# Patient Record
(demographics unavailable — no encounter records)

---

## 2024-10-10 NOTE — HISTORY OF PRESENT ILLNESS
[de-identified] : Fall [FreeTextEntry6] : Child slipped from a wooden slide this morning, 3 hours ago bruising on left side of cheek- mom wants to make sure child is okay. no loc no vomiting no changes in behavior

## 2024-10-10 NOTE — PHYSICAL EXAM
[NL] : moves all extremities x4, warm, well perfused x4 [de-identified] : bruise on the left cheek

## 2024-10-10 NOTE — PHYSICAL EXAM
[NL] : moves all extremities x4, warm, well perfused x4 [de-identified] : bruise on the left cheek

## 2024-10-23 NOTE — HISTORY OF PRESENT ILLNESS
[Mother] : mother [Cow's milk (Ounces per day ___)] : consumes [unfilled] oz of Cow's milk per day [Fruit] : fruit [Vegetables] : vegetables [Meat] : meat [Eggs] : eggs [Table food] : table food [Normal] : Normal [In crib] : In crib [Pacifier use] : Pacifier use [Brushing teeth] : Brushing teeth [Playtime] : Playtime  [No] : No cigarette smoke exposure [Water heater temperature set at <120 degrees F] : Water heater temperature set at <120 degrees F [Car seat in back seat] : Car seat in back seat [Carbon Monoxide Detectors] : Carbon monoxide detectors [Smoke Detectors] : Smoke detectors [Exposure to electronic nicotine delivery system] : No exposure to electronic nicotine delivery system [Up to date] : Up to date [de-identified] : straw cup

## 2024-10-23 NOTE — PHYSICAL EXAM

## 2024-11-20 NOTE — HISTORY OF PRESENT ILLNESS
[de-identified] : Vaccine [FreeTextEntry6] : Mother brought pt ion due to a follow up vaccine No fever during time of visit.

## 2024-11-20 NOTE — HISTORY OF PRESENT ILLNESS
[de-identified] : Vaccine [FreeTextEntry6] : Mother brought pt ion due to a follow up vaccine No fever during time of visit.

## 2024-11-29 NOTE — HISTORY OF PRESENT ILLNESS
[EENT/Resp Symptoms] : EENT/RESPIRATORY SYMPTOMS [Runny Nose] : runny nose [Fever] : no fever [Nasal Congestion] : no nasal congestion [Cough] : no cough [Vomiting] : no vomiting [Diarrhea] : no diarrhea [Decreased Urine Output] : no decreased urine output [de-identified] : no meds, no allergies

## 2024-12-04 NOTE — DISCUSSION/SUMMARY
[FreeTextEntry1] : Symptoms likely due to viral URI. Recommend supportive care including antipyretics, fluids, and nasal saline followed by nasal suction. Return if symptoms worsen or persist. if Sob or tachypnea or distress to go to ER apply bactroban on wound

## 2024-12-04 NOTE — HISTORY OF PRESENT ILLNESS
[de-identified] : COUGH [FreeTextEntry6] : pt is still having a phlegmy cough Pt is + for RSV and Entero

## 2024-12-04 NOTE — HISTORY OF PRESENT ILLNESS
[de-identified] : COUGH [FreeTextEntry6] : pt is still having a phlegmy cough Pt is + for RSV and Entero

## 2024-12-09 NOTE — HISTORY OF PRESENT ILLNESS
[de-identified] : Cough & fever [FreeTextEntry6] : Mother states pt have cough and did a fever of 101. Mother give Mortin at 7 in the [place] no fever during visit worsening of cough decrease oral intake no diarrhea

## 2024-12-09 NOTE — HISTORY OF PRESENT ILLNESS
[de-identified] : Cough & fever [FreeTextEntry6] : Mother states pt have cough and did a fever of 101. Mother give Mortin at 7 in the [place] no fever during visit worsening of cough decrease oral intake no diarrhea

## 2024-12-09 NOTE — PHYSICAL EXAM
[Clear Rhinorrhea] : clear rhinorrhea [Mucoid Discharge] : mucoid discharge [Wheezing] : wheezing [Crackles] : crackles [Tachypnea] : no tachypnea [Rhonchi] : no rhonchi [NL] : warm, clear

## 2025-01-09 NOTE — HISTORY OF PRESENT ILLNESS
[de-identified] : Vaccine [FreeTextEntry6] : mother brought pt in for a follow up vaccine low-grade fever of 99 during time of visit. slight cold  doing well otherwise eating well and very active  No

## 2025-01-09 NOTE — DISCUSSION/SUMMARY
[FreeTextEntry1] : Symptoms likely due to viral URI. Recommend supportive care including antipyretics, fluids, and nasal saline followed by nasal suction. Return if symptoms worsen or persist. saline in nebulizer

## 2025-01-09 NOTE — HISTORY OF PRESENT ILLNESS
[de-identified] : Vaccine [FreeTextEntry6] : mother brought pt in for a follow up vaccine low-grade fever of 99 during time of visit. slight cold  doing well otherwise eating well and very active

## 2025-01-16 NOTE — HISTORY OF PRESENT ILLNESS
[de-identified] : Cough and runny nose [FreeTextEntry6] : Kaylin presented to the clinic with her mother for evaluation of persistent cold symptoms. The patient received a vaccine last Wednesday and subsequently developed a runny nose and a low-grade fever of 99F. The symptoms have persisted for over a week. The patient's main complaints are a cough that worsens at night, affecting her sleep, and ongoing nasal congestion and rhinorrhea. The cough is occasionally present during the day but is most problematic when the patient is lying down. There have been no further fevers since the initial low-grade temperature.

## 2025-01-16 NOTE — DISCUSSION/SUMMARY
[FreeTextEntry1] : Assessment and Plan:   - **Upper Respiratory Infection (Common Cold):** The patient is diagnosed with a common cold based on the presentation of persistent cough, nasal congestion, and rhinorrhea without fever. The symptoms have lasted for over a week, which is consistent with the typical duration of a viral upper respiratory infection.     - Continue supportive care at home      - Use saline nasal drops to help with nasal congestion      - Administer Benadryl at night for the next 3-4 days to help with sleep and cough suppression      - Consider using Albuterol (which is available at home) for cough relief      - Monitor symptoms and return if condition worsens or new symptoms develop      - No antibiotics prescribed as the condition appears to be viral in nature

## 2025-02-01 NOTE — HISTORY OF PRESENT ILLNESS
[EENT/Resp Symptoms] : EENT/RESPIRATORY SYMPTOMS [Nasal Congestion] : nasal congestion [Cough] : cough [Max Temp: ____] : Max temperature: [unfilled] [Decreased Appetite] : no decreased appetite [Decreased Urine Output] : no decreased urine output [de-identified] : brother with rhino diagnosed earlier this week cough may be improving [Vomiting] : no vomiting [Diarrhea] : no diarrhea [Rash] : no rash

## 2025-02-01 NOTE — DISCUSSION/SUMMARY
[FreeTextEntry1] : URI, likely rhino/entero: Based on the symptoms, and recent contact with a sibling diagnosed with an enterovirus, the patient is likely suffering from a viral respiratory tract infection.   - Plan: Therapeutic Interventions: Continue nebulizer treatment with saline to alleviate cough and congestion Diagnostic Tests: No further tests required at the moment, discussed swabbing but given brother has known diagnosis very likely patient has rhinovirus which would not prompt a change in recommendations.  Referrals: No referrals needed at this time Patient Education: Recommended to keep an eye on the patient and bring them back if symptoms worsen or persist.

## 2025-02-01 NOTE — HISTORY OF PRESENT ILLNESS
[EENT/Resp Symptoms] : EENT/RESPIRATORY SYMPTOMS [Nasal Congestion] : nasal congestion [Cough] : cough [Max Temp: ____] : Max temperature: [unfilled] [Decreased Appetite] : no decreased appetite [Decreased Urine Output] : no decreased urine output [de-identified] : brother with rhino diagnosed earlier this week cough may be improving [Vomiting] : no vomiting [Diarrhea] : no diarrhea [Rash] : no rash

## 2025-02-12 NOTE — HISTORY OF PRESENT ILLNESS
[FreeTextEntry6] : Pt. here for vaccine. also follow up from cough congestion and runny nose and congestion  otherwise doing well no distress  no cough

## 2025-02-12 NOTE — DISCUSSION/SUMMARY
[FreeTextEntry1] : Start antibiotic treatments if worsens or SOB or chest pain to call back Symptoms likely due to viral URI. Recommend supportive care including antipyretics, fluids, and nasal saline followed by nasal suction. Return if symptoms worsen or persist. [] : The components of the vaccine(s) to be administered today are listed in the plan of care. The disease(s) for which the vaccine(s) are intended to prevent and the risks have been discussed with the caretaker.  The risks are also included in the appropriate vaccination information statements which have been provided to the patient's caregiver.  The caregiver has given consent to vaccinate.

## 2025-03-20 NOTE — HISTORY OF PRESENT ILLNESS
[de-identified] : Follow-up vaccine  [FreeTextEntry6] : Pt is here for vaccines follow up for URI  no diarrhea no distress

## 2025-03-20 NOTE — HISTORY OF PRESENT ILLNESS
[de-identified] : Follow-up vaccine  [FreeTextEntry6] : Pt is here for vaccines follow up for URI  no diarrhea no distress

## 2025-04-23 NOTE — PHYSICAL EXAM
[Alert] : alert [No Acute Distress] : no acute distress [Normocephalic] : normocephalic [Anterior [place] Closed] : anterior fontanelle closed [Red Reflex Bilateral] : red reflex bilateral [PERRL] : PERRL [Normally Placed Ears] : normally placed ears [Auricles Well Formed] : auricles well formed [Clear Tympanic membranes with present light reflex and bony landmarks] : clear tympanic membranes with present light reflex and bony landmarks [No Discharge] : no discharge [Nares Patent] : nares patent [Palate Intact] : palate intact [Uvula Midline] : uvula midline [Tooth Eruption] : tooth eruption  [Supple, full passive range of motion] : supple, full passive range of motion [No Palpable Masses] : no palpable masses [Symmetric Chest Rise] : symmetric chest rise [Clear to Auscultation Bilaterally] : clear to auscultation bilaterally [Regular Rate and Rhythm] : regular rate and rhythm [S1, S2 present] : S1, S2 present [No Murmurs] : no murmurs [+2 Femoral Pulses] : +2 femoral pulses [Soft] : soft [NonTender] : non tender [Normoactive Bowel Sounds] : normoactive bowel sounds [Non Distended] : non distended [No Hepatomegaly] : no hepatomegaly [No Splenomegaly] : no splenomegaly [Jacques 1] : Jacques 1 [No Clitoromegaly] : no clitoromegaly [Normal Vaginal Introitus] : normal vaginal introitus [Patent] : patent [Normally Placed] : normally placed [No Abnormal Lymph Nodes Palpated] : no abnormal lymph nodes palpated [No Clavicular Crepitus] : no clavicular crepitus [Symmetric Buttocks Creases] : symmetric buttocks creases [No Spinal Dimple] : no spinal dimple [NoTuft of Hair] : no tuft of hair [Cranial Nerves Grossly Intact] : cranial nerves grossly intact [No Rash or Lesions] : no rash or lesions

## 2025-04-23 NOTE — PHYSICAL EXAM

## 2025-04-23 NOTE — COUNSELING
[Use of Plain Language] : use of plain language [Adequate] : adequate [None] : none [] : I have reviewed management goals with caretaker and provided a copy of care plan Home

## 2025-04-23 NOTE — HISTORY OF PRESENT ILLNESS
[Cow's milk (Ounces per day ___)] : consumes [unfilled] oz of Cow's milk per day [Fruit] : fruit [Vegetables] : vegetables [Meat] : meat [Eggs] : eggs [Finger Foods] : finger foods [Table food] : table food [___ voids per day] : [unfilled] voids per day [Normal] : Normal [In crib] : In crib [Sippy cup use] : Sippy cup use [Brushing teeth] : Brushing teeth [Yes] : Patient goes to dentist yearly [Toilet Training] : Toilet training [No] : Not at  exposure [Water heater temperature set at <120 degrees F] : Water heater temperature set at <120 degrees F [Car seat in back seat] : Car seat in back seat [Smoke Detectors] : Smoke detectors [Carbon Monoxide Detectors] : Carbon monoxide detectors [At risk for exposure to TB] : At risk for exposure to Tuberculosis [Playtime 60 min a day] : Playtime 60 min a day [<2 hrs of screen time] : Less than 2 hrs of screen time [Exposure to electronic nicotine delivery system] : No exposure to electronic nicotine delivery system [Up to date] : Up to date [LastFluorideTreatment] : 02/2025

## 2025-04-23 NOTE — CARE PLAN
[Care Plan reviewed and provided to patient/caregiver] : Care plan reviewed and provided to patient/caregiver [Understands and communicates without difficulty] : Patient/Caregiver understands and communicates without difficulty [Difficulty Speaking (Unrelated to language)] : Patient/Caregiver has difficulty speaking (unrelated to language)

## 2025-06-06 NOTE — DISCUSSION/SUMMARY
[FreeTextEntry1] : appeared well very difficult to distinguish if it is a breath holding spell vs seizure stayed limp for a while  otherwise is appearing very well now  refer to neurology

## 2025-06-06 NOTE — HISTORY OF PRESENT ILLNESS
[de-identified] : hospital follow up [FreeTextEntry6] : mother brought pt in due to a previous incident. the pt was playing om the couch by falling forward onto the cushions. Mom states she went to check on her didn't visualize and scratches or bruising but she notices the pt cries and grunts. Mom states the pt eye rolled back, pt lips turned blue and lost color. Mom took the pt to ER and the incident happen last week Friday. Hospital did EKG, grew blood and a couple swabs. No fever during time of visit

## 2025-06-06 NOTE — HISTORY OF PRESENT ILLNESS
[de-identified] : hospital follow up [FreeTextEntry6] : mother brought pt in due to a previous incident. the pt was playing om the couch by falling forward onto the cushions. Mom states she went to check on her didn't visualize and scratches or bruising but she notices the pt cries and grunts. Mom states the pt eye rolled back, pt lips turned blue and lost color. Mom took the pt to ER and the incident happen last week Friday. Hospital did EKG, grew blood and a couple swabs. No fever during time of visit

## 2025-06-18 NOTE — HISTORY OF PRESENT ILLNESS
[FreeTextEntry1] : Referring Physician: Dr. Burroughs  CC: paroxysmal event concerning for seizure  HPI:     This is a 2 year old baby girl presenting for paroxysmal event concerning for possible seizure. Event of concern occurred on Friday 5/30/25. Kaylin was playing and jumping on the couch into the pillows face forward. At one point when she fell forward into the pillows she started to cry. Mom went to comfort her, she was crying and tried to distract her, she placed her on her lap facing away from her. She started to make some funny sounds - kind of like heaving sounds and then started to slump forward and her body seemed rigid. Mom laid her down and she then became unresponsive with her eyes closed. This lasted a few seconds and then she started to come back but then all of a sudden lost consciousness again, she became pale. She called 911. Within a few minutes neighbor who was a nurse arrived and saw she was breathing okay and she slowly became back to herself. She was taken to the ER where blood and EKG were normal.   Sleep: no concerns Diet: no concerns  Past medical history:  healthy, no surgeries    Allergies: NKDA  Current Medications: None   Birth history: No complications.  Developmental history:  No concerns from parents. Walked around a year, words also same time. Now is combining words and forming sentences, playful with others.   Family History:    There is no family history of seizures/epilepsy. Mom had recurrent episodes of vasovagal and convulsive syncope, has had several workups which were normal. Older brother Day has developmental delays.   Social history:   Lives with parents and older brother.   Neuroinvestigations:  rEEG today 6/18/2025: normal  ROS:  As per HPI. Denies constitutional, GI symptoms. No rashes. No headaches, no significant head injury. No corrective eyeglasses. No cough, SOB. No joint inflammation or arthralgia.   Physical Exam:  General: Well nourished, no dysmorphic features. In no acute distress. Normocephalic. No neurocutaneous stigmata.  Mental Status:   Child  is awake, alert, interested in surroundings. Interactive, speaks in short sentences. Behavior is age appropriate.   Cranial nerves: Pupils are 3 mm, symmetric, circular and reactive to light. fundi not visualized. Extraocular movements are in full range, with no strabismus. There is no ptosis or nystagmus. Facial sensations are grossly intact. There is no facial asymmetry, with normal facial movements bilaterally. Hearing is normal to conversation. Palatal movements are symmetric. The tongue is midline.     Motor: Movements are symmetric in all four extremities, with no evidence of any focal weakness. The tone is normal. Power is more than 4/5 in all groups of muscles across all major joints. There is no evidence of atrophy or hypertrophy of muscles. Reflexes: 2+ and symmetric at the biceps, triceps, brachioradialis, knees and ankles.     Sensory: No gross sensory deficits.   Cerebellar/associated motor: There is no evidence of tremor, dystonic posturing, or any abnormal movements. Appropriate narrow based gait  Assessment:  Kaylin's visit today had a duration of 60 minutes (>50% of which was spent in direct counseling and coordination of their care). I personally reviewed all pertinent aspects of her medical history, outside medical records, test results, recent developments, and then delineated next steps for their neurological care.    This is a 2 year old baby girl presenting for paroxysmal event concerning for possible seizure, event of concern does not seem consistent with seizure based on description, more likely a breath holding spell or type of vasovagal syncope in setting of distress, did not have a head injury to indicate concussion. Mom also has a strong history of vasovagal syncope which can also be familial which may be related to Kaylin's presentation. Discussed with pediatrician, may consider further cardiological workup as well. Her ferritin was below 40 in October of which iron supplementation may be helpful to prevent recurrence of events. Although semiology less suspsicious of seizure and normal EEG, we did discuss the role of rescue medication incase she does have a convulsive seizure.  Will continue to monitor how Kaylin does. If has another event will recommend prolonged video EEG monitoring.    Plan:   Iron supplementation, advised novaferrum 1 ml every other day or so Cardiology workup - Pediatrician will refer Continue to monitor, mom has my contact information if Kaylin has another event will admit for vEEG Diazepam 5 mg KS for convulsive seizure over 5 minutes  Follow up depending on how Kaylin does    Tiki Cohen,   of Neurology

## 2025-07-30 NOTE — CARDIOLOGY SUMMARY
[de-identified] : 7/30/2025 [FreeTextEntry1] : Sinus rhythm, normal axis and intervals for age. No preexcitation. Heart rate 102 bpm, NC interval 106 msec and QTc 422 msec.  [de-identified] : 7/30/2025 [FreeTextEntry2] : Summary: 1. Technically difficult examination due to suboptimal echocardiographic windows. 2. {S,D,S\} Situs solitus, D-ventricular looping, normally related great arteries. 3. Normal valvar function. 4. Normal left ventricular size, morphology and systolic function. 5. Normal right ventricular morphology with qualitatively normal size and systolic function. 6. No pericardial effusion.

## 2025-07-30 NOTE — CONSULT LETTER
[Today's Date] : [unfilled] [Name] : Name: [unfilled] [] : : ~~ [Today's Date:] : [unfilled] [Dear  ___:] : Dear Dr. [unfilled]: [Consult - Single Provider] : Thank you very much for allowing me to participate in the care of this patient. If you have any questions, please do not hesitate to contact me. [Sincerely,] : Sincerely, [DrJemima  ___] : Dr. RAMIRES [FreeTextEntry4] : Dr. Randi Camarena [FreeTextEntry5] : [address]  [FreeTextEntry6] : [place] [id number] [FreeTextEntry7] : [phone] [de-identified] : Jennifer Smerling, MD Pediatric Cardiology [facility]

## 2025-07-30 NOTE — PHYSICAL EXAM
[General Appearance - Alert] : alert [No Cough] : no cough [Auscultation Breath Sounds / Voice Sounds] : breath sounds clear to auscultation bilaterally [Stridor] : no stridor was observed [Respiration, Rhythm And Depth] : normal respiratory rhythm and effort [Apical Impulse] : quiet precordium with normal apical impulse [Heart Rate And Rhythm] : normal heart rate and rhythm [Heart Sounds] : normal S1 and S2 [No Murmur] : no murmurs  [Heart Sounds Gallop] : no gallops [Heart Sounds Pericardial Friction Rub] : no pericardial rub [Edema] : no edema [Arterial Pulses] : normal upper and lower extremity pulses with no pulse delay [Heart Sounds Click] : no clicks [Capillary Refill Test] : normal capillary refill [Bowel Sounds] : normal bowel sounds [Abdomen Soft] : soft [Nondistended] : nondistended [Abdomen Tenderness] : non-tender [] : no hepato-splenomegaly [Nail Clubbing] : no clubbing  or cyanosis of the fingers [de-identified] : warm and well perfused